# Patient Record
Sex: FEMALE | URBAN - METROPOLITAN AREA
[De-identification: names, ages, dates, MRNs, and addresses within clinical notes are randomized per-mention and may not be internally consistent; named-entity substitution may affect disease eponyms.]

---

## 2024-08-15 ENCOUNTER — APPOINTMENT (RX ONLY)
Dept: URBAN - METROPOLITAN AREA CLINIC 167 | Facility: CLINIC | Age: 54
Setting detail: DERMATOLOGY
End: 2024-08-15

## 2024-08-15 DIAGNOSIS — L81.1 CHLOASMA: ICD-10-CM | Status: INADEQUATELY CONTROLLED

## 2024-08-15 DIAGNOSIS — Z41.9 ENCOUNTER FOR PROCEDURE FOR PURPOSES OTHER THAN REMEDYING HEALTH STATE, UNSPECIFIED: ICD-10-CM

## 2024-08-15 PROCEDURE — ? SUNSCREEN RECOMMENDATIONS

## 2024-08-15 PROCEDURE — 99203 OFFICE O/P NEW LOW 30 MIN: CPT

## 2024-08-15 PROCEDURE — ? COUNSELING

## 2024-08-15 PROCEDURE — ? PRESCRIPTION

## 2024-08-15 PROCEDURE — ? PHOTO-DOCUMENTATION

## 2024-08-15 PROCEDURE — ? PRESCRIPTION MEDICATION MANAGEMENT

## 2024-08-15 PROCEDURE — ? ADDITIONAL NOTES

## 2024-08-15 RX ORDER — PHARMACY COMPOUNDING ACCESSORY
EACH MISCELLANEOUS QHS
Qty: 30 | Refills: 0 | Status: ERX | COMMUNITY
Start: 2024-08-15

## 2024-08-15 RX ADMIN — Medication: at 00:00

## 2024-08-15 ASSESSMENT — LOCATION SIMPLE DESCRIPTION DERM
LOCATION SIMPLE: RIGHT CHEEK
LOCATION SIMPLE: LEFT CHEEK

## 2024-08-15 ASSESSMENT — LOCATION ZONE DERM: LOCATION ZONE: FACE

## 2024-08-15 ASSESSMENT — LOCATION DETAILED DESCRIPTION DERM
LOCATION DETAILED: RIGHT INFERIOR CENTRAL MALAR CHEEK
LOCATION DETAILED: LEFT INFERIOR CENTRAL MALAR CHEEK

## 2024-08-15 NOTE — PROCEDURE: PRESCRIPTION MEDICATION MANAGEMENT
Render In Strict Bullet Format?: No
Detail Level: Zone
Initiate Treatment: .\\n\\n-pharmacy compounding accessory [facility]

## 2024-08-15 NOTE — PROCEDURE: ADDITIONAL NOTES
Render Risk Assessment In Note?: no
Additional Notes: Francois Bach ,started her on compound for melasma, consult for peel, f/U 4 weeks compound.
Detail Level: Simple

## 2024-09-12 ENCOUNTER — APPOINTMENT (RX ONLY)
Dept: URBAN - METROPOLITAN AREA CLINIC 167 | Facility: CLINIC | Age: 54
Setting detail: DERMATOLOGY
End: 2024-09-12

## 2024-09-12 DIAGNOSIS — Z41.9 ENCOUNTER FOR PROCEDURE FOR PURPOSES OTHER THAN REMEDYING HEALTH STATE, UNSPECIFIED: ICD-10-CM

## 2024-09-12 PROCEDURE — ? COSMETIC QUOTE

## 2024-09-12 PROCEDURE — ? ADDITIONAL NOTES

## 2024-09-12 PROCEDURE — ? COSMETIC CONSULTATION: CHEMICAL PEELS

## 2024-09-12 NOTE — PROCEDURE: COSMETIC QUOTE
Breast Procedure 9 Price/Unit (In Dollars- Use Only Numbers And Decimals): 0.00
Breast Procedure 2 Percentage Discount: 0
Face Procedure 1 Units: 3
Apply Facility Fee: No
Face Procedure 1 Percentage Discount: 10
Facility Fee Units (Optional): 1
Detail Level: Zone
Body Procedure 1: PRP microneedling scalp.
Face Procedure 3 Price/Unit (In Dollars- Use Only Numbers And Decimals): 99.
Face Procedure 1: chemical peel
Face Procedure 1 Price/Unit (In Dollars- Use Only Numbers And Decimals): 150
Include Sales Tax On Surgeon's Fees: Yes
Body Procedure 1 Price/Unit (In Dollars- Use Only Numbers And Decimals): 997

## 2024-09-12 NOTE — PROCEDURE: ADDITIONAL NOTES
Additional Notes: She is on the compound, will stop now, add brightening pads ,[employer] chemical next Tuesday. Declines brightening pads due to not taking cash and she didn’t want to pay the extra charge.
Render Risk Assessment In Note?: no
Detail Level: Simple